# Patient Record
Sex: MALE | Race: BLACK OR AFRICAN AMERICAN
[De-identification: names, ages, dates, MRNs, and addresses within clinical notes are randomized per-mention and may not be internally consistent; named-entity substitution may affect disease eponyms.]

---

## 2017-05-21 ENCOUNTER — HOSPITAL ENCOUNTER (EMERGENCY)
Dept: HOSPITAL 72 - EMR | Age: 52
Discharge: HOME | End: 2017-05-21
Payer: MEDICAID

## 2017-05-21 VITALS — HEIGHT: 66 IN | BODY MASS INDEX: 35.36 KG/M2 | WEIGHT: 220 LBS

## 2017-05-21 VITALS — DIASTOLIC BLOOD PRESSURE: 63 MMHG | SYSTOLIC BLOOD PRESSURE: 105 MMHG

## 2017-05-21 VITALS — DIASTOLIC BLOOD PRESSURE: 62 MMHG | SYSTOLIC BLOOD PRESSURE: 99 MMHG

## 2017-05-21 DIAGNOSIS — H40.89: Primary | ICD-10-CM

## 2017-05-21 DIAGNOSIS — H54.8: ICD-10-CM

## 2017-05-21 DIAGNOSIS — R73.03: ICD-10-CM

## 2017-05-21 DIAGNOSIS — H27.01: ICD-10-CM

## 2017-05-21 LAB
ANION GAP SERPL CALC-SCNC: 14 MMOL/L (ref 5–15)
CALCIUM SERPL-MCNC: 9.1 MG/DL (ref 8.6–10.2)
CHLORIDE SERPL-SCNC: 101 MEQ/L (ref 98–107)
CO2 SERPL-SCNC: 24 MEQ/L (ref 20–30)
CREAT SERPL-MCNC: 0.9 MG/DL (ref 0.7–1.2)
GFR SERPLBLD BASED ON 1.73 SQ M-ARVRAT: > 60 ML/MIN (ref 60–?)
HEMOLYSIS: 20
POTASSIUM SERPL-SCNC: 4 MEQ/L (ref 3.4–4.9)
SODIUM SERPL-SCNC: 139 MEQ/L (ref 135–145)

## 2017-05-21 PROCEDURE — 80048 BASIC METABOLIC PNL TOTAL CA: CPT

## 2017-05-21 PROCEDURE — 36415 COLL VENOUS BLD VENIPUNCTURE: CPT

## 2017-05-21 PROCEDURE — 99284 EMERGENCY DEPT VISIT MOD MDM: CPT

## 2017-05-21 PROCEDURE — 93005 ELECTROCARDIOGRAM TRACING: CPT

## 2017-05-21 PROCEDURE — 83036 HEMOGLOBIN GLYCOSYLATED A1C: CPT

## 2017-05-21 PROCEDURE — 82962 GLUCOSE BLOOD TEST: CPT

## 2017-05-21 NOTE — EMERGENCY ROOM REPORT
History of Present Illness


General


Chief Complaint:  General Complaint


Source:  Patient





Present Illness


HPI


50 y/o male c/o right eye pain x 2.5 weeks. States that his assoc sxs include 

watery discharge from right eye, hazy vision from right eye, states he feels he 

cannot see from right eye, right eye pain, tenderness on superior aspect if 

right eye / upper eye lid, photophobia and red eye. States he went to the 

[facility] that he had been to in the past but told him to his 

PCP to get a referral to get an appointment. Patient had an appt with NP at PCP 

office for routine Px.  States that he was given polytrim eye drops but has 

used it without improvement. Denies very waking up with his eyes glued shut and 

denies any mucoid discharge or crusting on eye lids. There are no provoking or 

relieving factors. Has hx of cataract and glaucoma sx in the past back in the 

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 States his baseline for right eye is normal 

vision w/o glasses. Denies any trauma, foreign body, use of contact lenses, and 

headache


Allergies:  


Coded Allergies:  


     No Known Allergies (Unverified , 5/21/17)





Patient History


Past Medical History:  see triage record


Past Surgical History:  other


Pertinent Family History:  none


Reviewed Nursing Documentation:  PMH: Agreed, PSxH: Agreed





Nursing Documentation-PMH


Past Medical History:  No History, Except For


Hx Neurological Problems:  Yes - legally blind





Review of Systems


All Other Systems:  negative except mentioned in HPI





Physical Exam





Vital Signs








  Date Time  Temp Pulse Resp B/P Pulse Ox O2 Delivery O2 Flow Rate FiO2


 


5/21/17 14:37 98.1 98 19 99/62 95 Room Air  








Sp02 EP Interpretation:  reviewed, normal


General Appearance:  no apparent distress, alert, GCS 15, non-toxic


Head:  normocephalic, atraumatic


Eyes:  right eye Fundiscopic - normal, right eye Scleral Injection, right eye 

abnormal pupil - misshapened pupid, reactive, bilateral eye EOMI, bilateral eye 

PERRL


ENT:  hearing grossly normal, no angioedema, normal voice


Neck:  full range of motion, no carotid bruits, supple/symm/no masses


Respiratory:  lungs clear, normal breath sounds, speaking full sentences


Cardiovascular #1:  regular rate, rhythm, no edema


Cardiovascular #2:  2+ carotid (R), 2+ carotid (L)


Neurologic:  alert, oriented x3, responsive, motor strength/tone normal, 

sensory intact, speech normal


Psychiatric:  judgement/insight normal, memory normal, mood/affect normal, no 

suicidal/homicidal ideation


Skin:  normal color, no rash, warm/dry, well hydrated





Medical Decision Making


PA Attestation


Dr. Villegas my supervising physician with whom patient management has been 

discussed with.


Diagnostic Impression:  


 Primary Impression:  


 Neovascular glaucoma of right eye


 Qualified Codes:  H40.51X0 - Glaucoma secondary to other eye disorders, right 

eye, stage unspecified


 Additional Impression:  


 Prediabetes


ER Course


Pt. presents to the ED c/o Right eye pain and decreased vision


Ddx considered but are not limited to Glaucoma, Retinal detachment, iritis, 

conjunctivitis. 


Vital signs: are WNL, pt. is afebrile 


H&PE are most consistent with Acute on chronic glaucoma due to 

neovascularization & prediabetes


ORDERS: Tetracaine, EKG, Timolol, Acetazolamide 500mg IV, BMP, A1C


ED INTERVENTIONS:  Patient was given 3 drops of tetracaine in right eye and had 

no reduction of pain. The tonopen had first set of measurements that had IOP of 

51. The tonopen was used to verify IOP and came with avg measurement of 43.Dr. Villegas was alerted and verified IOP at 41.  Another 2 drops of tetracaine 

was given by Dr. Villegas as patient's pain had no improved at all and then 

patient was then laid supine and informed to stay supine. Dr. Villegas placed 

a call in to Dr. Freeman Kramer (Ophthalmology)  who advised to give timolol and 

acetazolamide. An EKG was taken that shows NSR w/o any BBB or hear blocks. 


DISCHARGE: At this time pt. is stable for d/c to home. Will provide printed 

patient care instructions, and any necessary prescriptions. Care plan and 

follow up instructions have been discussed with the patient prior to discharge.





Laboratory Tests








Test


  5/21/17


16:30


 


Sodium Level


  139 mEQ/L


(135-145)


 


Potassium Level


  4.0 mEQ/L


(3.4-4.9)


 


Chloride Level


  101 mEQ/L


()


 


Carbon Dioxide Level


  24 mEQ/L


(20-30)


 


Anion Gap 14 (5-15)  


 


Blood Urea Nitrogen


  13 mg/dL


(7-23)


 


Creatinine


  0.9 mg/dL


(0.7-1.2)


 


Estimate Glomerular


Filtration Rate > 60 mL/min


(>60)


 


Glucose Level


  101 mg/dL


()


 


Hemoglobin A1c 5.9 % (< 6.0)  


 


Calcium Level


  9.1 mg/dL


(8.6-10.2)








EKG Diagnostic Results


Rate:  normal


Rhythm:  NSR


ST Segments:  no acute changes





Last Vital Signs








  Date Time  Temp Pulse Resp B/P Pulse Ox O2 Delivery O2 Flow Rate FiO2


 


5/21/17 18:01 98.1 91 19 105/63 98 Room Air  








Status:  improved


Disposition:  HOME, SELF-CARE


Condition:  Stable











ELLIOTT GOULD May 21, 2017 14:57

## 2017-05-21 NOTE — CONSULTATION
Consult Note


Consult Note


Ophthalmology/Ophthalmic Plastic Surgery Consultation








Referring Physician: Zack Duval MD





Reason for Consultation: elevated eye pressure 





History of the present illness: the patient is a 51-year-old man with an 

approximately 2 week history of blurred vision, tearing and pain in his right 

eye. He was started on antibiotic drops by his primary doctor. His symptoms 

worsened prompting him to present to he ER. He has a remote hx of trauma to the 

left eye which resulted in glaucoma and severe vision loss. Noted by ER staff 

to have elevated IOP >40 mmHg OD.





Past medical Hx:


left eye injury





Medications:


none





Allergies:


NKDA





Family History:


no diabetes





Social History:


smokes 2 cigarettes per day, not currently working due to disability





Review of Systems:


General: no fever


HEENT: see HPI


Cardiac: no chest pain


Respiratory: no shortness of breath


GI: no nausea


: no urinary complaints


MS: no joint pains


Derm: no rashes


Psychiatric: no depression


Neurologic: no numbness, no weakness


Heme: no easy bruising


 


Examination:


Mini-mental status examination revealed the patient to be awake, alert and 

oriented to person, place and time with appropriate mood and affect.





Visual Acuity at near without correction: 


OD: 20/400


OS: counting fingers at 1 foot





Intraocular pressure (after timolol and diamox): 


4:10pm


OD: 29 mmHg


OS: 24 mmHg





rechecked after additional topical drops


OD: 25 mmHg





Pupils: OD non-reactive, round; OS absent


Extra-ocular motility: full OU


Confrontational visual fields: full to finger counting OU





Anterior Segments:


External: normal lids and orbits OU


Conjunctivae: 2+ injection OD; white and quiet OS


Cornea: Clear OU


Anterior Chambers: Deep and Quiet OU


Irides: rubeosis OD; post traumatic iris, only temporal sliver present


Lenses: nuclear sclerosis OD; aphakic OS





Dilated Fundus Examination (tropicamide OD, poor dilation):


Vitreous: limited view OD due to miosis; Clear OS


Optic Nerves: NV vs hemorrhage on disc OD; Sharp OS


Vessels: Normal course and caliber OD (limited view); Normal course and caliber 

OS


Maculae: flat OD (limited view); flat OS


Periphery: multiple intraretinal hemorrhages OD (limited view); peripheral 

scarring OS





Studies: finger stick glucose 101


Assessment/Plan


Impression:


1. Neovascular glaucoma OD


2. Aphakia OS





Assessment and Plan:


Mr. Bhatti is a 51-year-old man with neovascular glaucoma in the right eye. 

Possible causes include diabetic retinopathy, retinal vascular occlusion and 

other causes of ischemia. Blood work in the ER showed normal glucose levels. 

The view of the retina is limited due to miosis. Fortunately the intraocular 

pressure responded promptly to aggressive medical treatment in the ER including 

IV diamox, topical timolol, brimonidine, and dorzolamide. The patient also has 

a history of a remote injury to the left eye leaving him with very limited 

vision. Because of the serious nature of his situation urgent ophthalmology 

follow up was advised. This will be arranged by the ER and the patient's health 

plan. The patient will also be continued on timolol, dorzolamide, and 

brimonidine OD BID for now. The condition including the risk of permanent 

vision loss and need for close follow up and treatment were discussed with the 

patient and the ER staff. The patient's questions were answered. 








Thank you very much for this consultation


Freeman Kramer M.D.


[phone]











FREEMAN KRAMER May 21, 2017 16:33

## 2017-05-23 NOTE — CARDIOLOGY REPORT
--------------- APPROVED REPORT --------------





EKG Measurement

Heart [id number]

CO [id number]

[id number]

[id number]

[id number]





Normal sinus rhythm

Left axis deviation

Abnormal ECG

## 2018-11-09 ENCOUNTER — HOSPITAL ENCOUNTER (EMERGENCY)
Dept: HOSPITAL 72 - EMR | Age: 53
LOS: 1 days | Discharge: HOME | End: 2018-11-10
Payer: COMMERCIAL

## 2018-11-09 VITALS — BODY MASS INDEX: 30.53 KG/M2 | WEIGHT: 190 LBS | HEIGHT: 66 IN

## 2018-11-09 VITALS — SYSTOLIC BLOOD PRESSURE: 103 MMHG | DIASTOLIC BLOOD PRESSURE: 65 MMHG

## 2018-11-09 DIAGNOSIS — H26.9: ICD-10-CM

## 2018-11-09 DIAGNOSIS — M16.12: Primary | ICD-10-CM

## 2018-11-09 DIAGNOSIS — E11.9: ICD-10-CM

## 2018-11-09 LAB
ADD MANUAL DIFF: NO
ALBUMIN SERPL-MCNC: 3.6 G/DL (ref 3.4–5)
ALBUMIN/GLOB SERPL: 0.8 {RATIO} (ref 1–2.7)
ALP SERPL-CCNC: 74 U/L (ref 46–116)
ALT SERPL-CCNC: 28 U/L (ref 12–78)
ANION GAP SERPL CALC-SCNC: 7 MMOL/L (ref 5–15)
APTT BLD: 28 SEC (ref 23–33)
AST SERPL-CCNC: 15 U/L (ref 15–37)
BASOPHILS NFR BLD AUTO: 1.8 % (ref 0–2)
BILIRUB SERPL-MCNC: 0.2 MG/DL (ref 0.2–1)
BUN SERPL-MCNC: 17 MG/DL (ref 7–18)
CALCIUM SERPL-MCNC: 9 MG/DL (ref 8.5–10.1)
CHLORIDE SERPL-SCNC: 103 MMOL/L (ref 98–107)
CO2 SERPL-SCNC: 29 MMOL/L (ref 21–32)
CREAT SERPL-MCNC: 1.2 MG/DL (ref 0.55–1.3)
EOSINOPHIL NFR BLD AUTO: 0.9 % (ref 0–3)
ERYTHROCYTE [DISTWIDTH] IN BLOOD BY AUTOMATED COUNT: 13 % (ref 11.6–14.8)
GLOBULIN SER-MCNC: 4.4 G/DL
HCT VFR BLD CALC: 45.2 % (ref 42–52)
HGB BLD-MCNC: 14.4 G/DL (ref 14.2–18)
INR PPP: 1 (ref 0.9–1.1)
LYMPHOCYTES NFR BLD AUTO: 17 % (ref 20–45)
MCV RBC AUTO: 79 FL (ref 80–99)
MONOCYTES NFR BLD AUTO: 7.3 % (ref 1–10)
NEUTROPHILS NFR BLD AUTO: 73 % (ref 45–75)
PLATELET # BLD: 213 K/UL (ref 150–450)
POTASSIUM SERPL-SCNC: 4.2 MMOL/L (ref 3.5–5.1)
RBC # BLD AUTO: 5.75 M/UL (ref 4.7–6.1)
SODIUM SERPL-SCNC: 139 MMOL/L (ref 136–145)
WBC # BLD AUTO: 11.5 K/UL (ref 4.8–10.8)

## 2018-11-09 PROCEDURE — 82962 GLUCOSE BLOOD TEST: CPT

## 2018-11-09 PROCEDURE — 72170 X-RAY EXAM OF PELVIS: CPT

## 2018-11-09 PROCEDURE — 80307 DRUG TEST PRSMV CHEM ANLYZR: CPT

## 2018-11-09 PROCEDURE — 99284 EMERGENCY DEPT VISIT MOD MDM: CPT

## 2018-11-09 PROCEDURE — 80053 COMPREHEN METABOLIC PANEL: CPT

## 2018-11-09 PROCEDURE — 36415 COLL VENOUS BLD VENIPUNCTURE: CPT

## 2018-11-09 PROCEDURE — 85730 THROMBOPLASTIN TIME PARTIAL: CPT

## 2018-11-09 PROCEDURE — 85651 RBC SED RATE NONAUTOMATED: CPT

## 2018-11-09 PROCEDURE — 84550 ASSAY OF BLOOD/URIC ACID: CPT

## 2018-11-09 PROCEDURE — 85025 COMPLETE CBC W/AUTO DIFF WBC: CPT

## 2018-11-09 PROCEDURE — 96374 THER/PROPH/DIAG INJ IV PUSH: CPT

## 2018-11-09 PROCEDURE — 86140 C-REACTIVE PROTEIN: CPT

## 2018-11-09 PROCEDURE — 73502 X-RAY EXAM HIP UNI 2-3 VIEWS: CPT

## 2018-11-09 PROCEDURE — 81003 URINALYSIS AUTO W/O SCOPE: CPT

## 2018-11-09 PROCEDURE — 96361 HYDRATE IV INFUSION ADD-ON: CPT

## 2018-11-09 PROCEDURE — 85610 PROTHROMBIN TIME: CPT

## 2018-11-09 NOTE — DIAGNOSTIC IMAGING REPORT
EXAM:

  XR Pelvis, 1 or 2 Views

 

CLINICAL HISTORY:

  PAIN

 

TECHNIQUE:

  Frontal view of the pelvis.

 

COMPARISON:

  No relevant prior studies available.

 

FINDINGS:

  Bones/joints:  No acute displaced fracture or dislocation.

  Soft tissues:  Unremarkable.

 

IMPRESSION:     

  No acute displaced fracture or dislocation.

## 2018-11-09 NOTE — EMERGENCY ROOM REPORT
History of Present Illness


General


Chief Complaint:  Pain


Source:  Patient





Present Illness


HPI


Patient presents with left hip pain.  This been going on for months but then 

became severe the last 2-3 days.  He's been taking Motrin and Tylenol.  He 

denies any fevers, chills, nausea, vomiting, diarrhea.  There was no trauma.  

The pain is aching and worse when his palpated.  He uses a cane but also came 

in with a wheelchair.  He states the pain has been present for several months, 

but worse for 3 days.  No prior x-rays.  Does not know if he has gout.  Pain is 

rated 10/10.





No fevers, cough, dyspnea, chest pain.  No dysuria.  No rashes.  No depression.





Blind from glaucoma/cataracts.





Diabetes.  States good control.





Here with sig other.


Allergies:  


Coded Allergies:  


     No Known Allergies (Unverified , 5/21/17)





Patient History


Past Medical History:  see triage record


Social History:  Reports: smoking


Social History Narrative


with caretaker


Reviewed Nursing Documentation:  PMH: Agreed; PSxH: Agreed





Nursing Documentation-PMH


Hx Diabetes:  Yes - dm


Hx Neurological Problems:  Yes - cataract





Review of Systems


All Other Systems:  negative except mentioned in HPI





Physical Exam





Vital Signs








  Date Time  Temp Pulse Resp B/P (MAP) Pulse Ox O2 Delivery O2 Flow Rate FiO2


 


11/9/18 21:33 98.4 87 16 100/62 95 Room Air  








Sp02 EP Interpretation:  reviewed, normal


General Appearance:  well appearing, no apparent distress, GCS 15


Head:  normocephalic


Eyes:  bilateral eye other - cataracts


ENT:  moist mucus membranes


Neck:  supple


Respiratory:  lungs clear, normal breath sounds


Cardiovascular #1:  regular rate, rhythm


Cardiovascular #2:  2+ radial (R)


Gastrointestinal:  normal inspection, normal bowel sounds, non tender, no mass, 

non-distended


Genitourinary:  no CVA tenderness


Musculoskeletal:  back normal, no calf tenderness, tender - L hip but not to 

palpation.  PROM good with some minimal tenderness, no deformity or crepetance


Neurologic:  alert, oriented x3, motor strength/tone normal, DTRs symmetric, 

sensory intact, cerebellar normal, speech normal


Psychiatric:  mood/affect normal


Skin:  normal inspection, warm/dry





Medical Decision Making


Diagnostic Impression:  


 Primary Impression:  


 Osteoarthritis


 Qualified Codes:  M16.12 - Unilateral primary osteoarthritis, left hip


ER Course


Patient with non-traumatic L hip pain.  DDx: gout, osteoarthritis, bursitis, 

tendinitis amongst others.  Evaluation with labs and xrays.  Treatment with 

gentle hydration and toradol.





Labs with normal CBC, CMP, ESR.  Uric acid normal.  UA normal.  Xrays with 

calcification near greater trochanter.  Min djd.





Improved after treatment and now ambulatory.





Patient stable for outpatient observation and treatment.


Other X-Ray Diagnostic Results


Other X-Ray Diagnostic Results #1:  


   X-Ray ordered:  R hip


   # of Views/Limited Vs Complete:  2 View


   Indication:  Pain


   Interpretation:  no dislocation, no soft tissue swelling, no fractures, 

other - DJD - calcification near greater trochanter


   Impression:  Other


   Electronically Signed by:  Hany Iraheta MD


Other X-Ray Diagnostic Results #2:  


   X-Ray ordered:  pelvis


   # of Views/Limited Vs Complete:  1 View


   Indication:  Pain


   EP Interpretation:  Yes


   Interpretation:  no dislocation, no soft tissue swelling, no fractures, 

other - see hip


   Impression:  Other


   Electronically Signed by:  Hany Iraheta MD





Last Vital Signs








  Date Time  Temp Pulse Resp B/P (MAP) Pulse Ox O2 Delivery O2 Flow Rate FiO2


 


11/10/18 03:04 98.4 81 17 103/65 95 Room Air  








Status:  improved


Disposition:  HOME, SELF-CARE


Condition:  Improved


Scripts


Acetaminophen (Tylenol) 325 Mg Tablet


650 MG ORAL Q6H PRN for Prn Pain/Headache/Temp > 101, #20 TAB 0 Refills


   Prov: Hany Iraheta MD         11/10/18 


Naproxen* (NAPROXEN*) 375 Mg Tablet.dr


375 MG ORAL TID PRN for For Pain, #30 TAB 1 Refill


   Prov: Hany Iraheta MD         11/10/18 


Tramadol Hcl* (ULTRAM*) 50 Mg Tablet


50 MG ORAL Q6H PRN for For Pain, #6 TAB 0 Refills


   Prov: Hany Iraheta MD         11/10/18











Hany Iraheta MD Nov 9, 2018 21:54

## 2018-11-09 NOTE — DIAGNOSTIC IMAGING REPORT
EXAM:

  XR Left Hip, 2 or 3 Views

 

CLINICAL HISTORY:

  PAIN

 

TECHNIQUE:

  Two or three views of the left hip.

 

COMPARISON:

  No relevant prior studies available.

 

FINDINGS:

  Bones/joints:  No acute displaced fracture or dislocation.

  Soft tissues:  Unremarkable.

 

IMPRESSION:     

  No acute displaced fracture or dislocation.

## 2018-11-10 VITALS — DIASTOLIC BLOOD PRESSURE: 65 MMHG | SYSTOLIC BLOOD PRESSURE: 103 MMHG

## 2018-11-10 LAB
APPEARANCE UR: CLEAR
APTT PPP: (no result) S
GLUCOSE UR STRIP-MCNC: NEGATIVE MG/DL
KETONES UR QL STRIP: NEGATIVE
LEUKOCYTE ESTERASE UR QL STRIP: NEGATIVE
NITRITE UR QL STRIP: NEGATIVE
PH UR STRIP: 8 [PH] (ref 4.5–8)
PROT UR QL STRIP: (no result)
SP GR UR STRIP: 1.01 (ref 1–1.03)
UROBILINOGEN UR-MCNC: NORMAL MG/DL (ref 0–1)